# Patient Record
Sex: MALE | Race: WHITE | NOT HISPANIC OR LATINO | Employment: FULL TIME | URBAN - NONMETROPOLITAN AREA
[De-identification: names, ages, dates, MRNs, and addresses within clinical notes are randomized per-mention and may not be internally consistent; named-entity substitution may affect disease eponyms.]

---

## 2023-10-17 ENCOUNTER — OFFICE VISIT (OUTPATIENT)
Dept: URGENT CARE | Facility: CLINIC | Age: 43
End: 2023-10-17
Payer: COMMERCIAL

## 2023-10-17 VITALS
HEART RATE: 82 BPM | RESPIRATION RATE: 16 BRPM | TEMPERATURE: 97 F | WEIGHT: 250 LBS | OXYGEN SATURATION: 98 % | BODY MASS INDEX: 41.65 KG/M2 | SYSTOLIC BLOOD PRESSURE: 132 MMHG | HEIGHT: 65 IN | DIASTOLIC BLOOD PRESSURE: 84 MMHG

## 2023-10-17 DIAGNOSIS — L03.032 PARONYCHIA OF GREAT TOE, LEFT: Primary | ICD-10-CM

## 2023-10-17 PROCEDURE — 99213 OFFICE O/P EST LOW 20 MIN: CPT

## 2023-10-17 RX ORDER — SUMATRIPTAN 50 MG/1
TABLET, FILM COATED ORAL
COMMUNITY
Start: 2023-07-21

## 2023-10-17 RX ORDER — TIZANIDINE 2 MG/1
TABLET ORAL
COMMUNITY

## 2023-10-17 RX ORDER — BUSPIRONE HYDROCHLORIDE 7.5 MG/1
TABLET ORAL
COMMUNITY
Start: 2023-09-17

## 2023-10-17 RX ORDER — PREGABALIN 150 MG/1
CAPSULE ORAL
COMMUNITY

## 2023-10-17 RX ORDER — DOXYCYCLINE 100 MG/1
100 TABLET ORAL 2 TIMES DAILY
Qty: 14 TABLET | Refills: 0 | Status: SHIPPED | OUTPATIENT
Start: 2023-10-17 | End: 2023-10-24

## 2023-10-17 RX ORDER — PROPRANOLOL HYDROCHLORIDE 40 MG/1
40 TABLET ORAL 2 TIMES DAILY
COMMUNITY

## 2023-10-17 RX ORDER — LOSARTAN POTASSIUM 50 MG/1
1 TABLET ORAL DAILY
COMMUNITY
Start: 2023-08-07

## 2023-10-17 NOTE — PROGRESS NOTES
[facility]        NAME: Ashley Vasques is a 41 y.o. male  : 1980    MRN: [id number]  DATE: 2023  TIME: 6:13 PM    Assessment and Plan   Paronychia of great toe, left [L03.032]  1. Paronychia of great toe, left  Ambulatory Referral to Podiatry    doxycycline (ADOXA) 100 MG tablet        Clinical findings correlate with paronychia and cellulitis however abscess formation not I&D able. Will treat with doxycycline given documented allergies. Encouraged continued supportive measures. Monitor for signs of worsening infection. Referral placed to Podiatry. Follow up with PCP in 3-5 days or proceed to emergency department for worsening symptoms. Patient verbalized understanding of instructions given. Patient Instructions     Patient Instructions   Take antibiotic as prescribed  Warm soaks  Can apply topical antibiotic ointment  Monitor for signs of worsening infection  Referral placed to Podiatry   Follow up with PCP in 3-5 days. Proceed to  ER if symptoms worsen. Paronychia   WHAT YOU NEED TO KNOW:   What is paronychia? Paronychia is an infection of your nail fold caused by bacteria or a fungus. The nail fold is the skin around your nail. Paronychia may happen suddenly and last for 6 weeks or longer. You may have paronychia on more than 1 finger or toe. What increases my risk for paronychia? Trauma:  Any injury that causes your skin to tear can lead to infection. Your risk is increased if you have ingrown nails, bite your nails, or wear acrylic nails. Frequent contact with water:  Jobs that require you to soak your hands in water often may increase your risk for paronychia. Common examples are nurses, cooks, and . Swimmers also have increased risk. Medical conditions:  Diabetes and other conditions that cause a weak immune system can increase your risk. Some examples are skin cancer, psoriasis, HIV, and lupus.     Chemicals:  Contact with soaps, detergents, and other chemicals can cause inflammation and lead to paronychia. Allergies: Allergies to certain foods, nail polish, or latex can cause inflammation and increase your risk. What are the signs and symptoms of paronychia? Red, hot, swollen, painful nail fold    Pus coming out of your nail fold when you press on it    Nail that pulls away from your nail fold and may fall off    Changes in nail color, such as green nails    Fever    Thick, rough nail, or ridges in the nail    How is paronychia diagnosed? Your healthcare provider will examine your nails and ask about your symptoms. Your provider may press on your infected nail to see if pus drains from it. Your provider will send any pus to a lab for tests to learn what germ is causing your infection. This is called a fluid culture. How is paronychia treated? Medicine:     Td vaccine  is a booster shot used to help prevent tetanus and diphtheria. The Td booster may be given to adolescents and adults every 10 years or for certain wounds and injuries. Antibiotics: This medicine will help fight or prevent an infection caused by bacteria. It may be given as a pill, cream, or ointment. Steroids: This medicine will help decrease inflammation. It may be given as a pill, cream, or ointment. Antifungal medicine: This medicine helps kill fungus that may be causing your infection. It may be given as a cream or ointment. NSAIDs:  These medicines decrease pain and swelling. NSAIDs are available without a doctor's order. Ask your healthcare provider which medicine is right for you. Ask how much to take and when to take it. Take as directed. NSAIDs can cause stomach bleeding and kidney problems if not taken correctly. Procedures: You may need surgery to drain an abscess (pus pocket) in your finger or toe. Your nail may need to be removed. Infected tissue around your nail may also need to be removed. What are the risks of paronychia? Your nail may become loose, deformed, or fall off. The infection may form a large abscess on your nail. The infection may spread to nearby tissue and bone. How can paronychia be prevented? Avoid chemicals and allergens that may harm your skin and nails. This includes soaps, laundry detergents, and nail products. Keep your nails clean and dry. Do not soak your nails in water. Use cotton-lined rubber gloves or wear 2 rubber gloves if you work with food or water. The gloves will help protect your nail folds. Keep your nails short. Do not bite your nails, pick at your hangnails, suck your fingers, or wear fake nails. Bring your own nail tools when you go to the nail salon. How can I manage my symptoms? Soak your nail:  Soak your nail in a mixture of equal parts vinegar and water 3 or 4 times each day. This will help decrease inflammation. Apply a warm compress:  Soak a washcloth in warm water and place it on your nail. This will help decrease inflammation. Elevate:  Raise your nail above the level of your heart as often as you can. This will help decrease swelling and pain. Prop your nail on pillows or blankets to keep it elevated comfortably. Use lotion:  Apply lotion after you wash your hands. This will prevent the skin from becoming too dry. When should I seek immediate care? You have severe nail pain. The inflammation spreads to your hand or arm. When should I call my doctor? Your nail becomes loose, deformed, or falls off. You have a large abscess on your nail. You have questions or concerns about your condition or care. CARE AGREEMENT:   You have the right to help plan your care. Learn about your health condition and how it may be treated. Discuss treatment options with your healthcare providers to decide what care you want to receive. You always have the right to refuse treatment. The above information is an  only.  It is not intended as medical advice for individual conditions or treatments. Talk to your doctor, nurse or pharmacist before following any medical regimen to see if it is safe and effective for you. © Copyright [employer] 2023 Information is for End User's use only and may not be sold, redistributed or otherwise used for commercial purposes. Chief Complaint     Chief Complaint   Patient presents with    Ingrown Toenail     Ingrown toenail on left great toe         History of Present Illness       46-year-old male with a past medical history significant for hypertension presents with complaints of ingrown toenail. Patient reports removing corner of toenail yesterday and since has had increased redness, swelling, drainage, and pain. He has not been performing warm soaks or applying topical antibiotic ointment. Denies any specific known injury or trauma. Denies fever, chills, or flu-like symptoms. No history of diabetes. Review of Systems   Review of Systems   Constitutional:  Negative for chills and fever. Respiratory:  Negative for cough and shortness of breath. Cardiovascular:  Negative for chest pain. Gastrointestinal:  Negative for abdominal pain, diarrhea, nausea and vomiting. Musculoskeletal:  Negative for arthralgias and myalgias. Skin:  Positive for color change. Neurological:  Negative for weakness and numbness.          Current Medications       Current Outpatient Medications:     busPIRone (BUSPAR) 7.5 mg tablet, take 1 tablet by mouth every morning and 1 BEFORE BEDTIME, Disp: , Rfl:     doxycycline (ADOXA) 100 MG tablet, Take 1 tablet (100 mg total) by mouth 2 (two) times a day for 7 days, Disp: 14 tablet, Rfl: 0    losartan (COZAAR) 50 mg tablet, Take 1 tablet by mouth daily, Disp: , Rfl:     pregabalin (LYRICA) 150 mg capsule, take 1 capsule by mouth every morning and 1 capsule BEFORE BEDTIME, Disp: , Rfl:     propranolol (INDERAL) 40 mg tablet, Take 40 mg by mouth 2 (two) times a day, Disp: , Rfl: SUMAtriptan (IMITREX) 50 mg tablet, take 1 tablet by mouth if needed AT ONSET OF HEADACHE may repeat in 2 hours IF headache PERSISTS maximum daily dose of 2 tablets ( 100 milligrams ) every 24 hours, Disp: , Rfl:     tiZANidine (ZANAFLEX) 2 mg tablet, take 1/4 to 1/2 tablet by mouth twice a day and 1 tablet by mouth at bedtime as needed, Disp: , Rfl:     Current Allergies     Allergies as of 10/17/2023 - Reviewed 10/17/2023   Allergen Reaction Noted    Penicillins Other (See Comments) 06/27/2018    Sulfa antibiotics Rash 06/27/2018            The following portions of the patient's history were reviewed and updated as appropriate: allergies, current medications, past family history, past medical history, past social history, past surgical history and problem list.     Past Medical History:   Diagnosis Date    Anxiety     Hypertension     Migraine        Past Surgical History:   Procedure Laterality Date    NO PAST SURGERIES         Family History   Problem Relation Age of Onset    No Known Problems Mother     Diabetes Father     Hyperlipidemia Father     Hypertension Father          Medications have been verified. Objective   /84   Pulse 82   Temp (!) 97 °F (36.1 °C)   Resp 16   Ht 5' 5" (1.651 m)   Wt 113 kg (250 lb)   SpO2 98%   BMI 41.60 kg/m²   No LMP for male patient. Physical Exam     Physical Exam  Vitals and nursing note reviewed. Constitutional:       General: He is not in acute distress. Appearance: He is not toxic-appearing. HENT:      Head: Normocephalic. Nose: Nose normal.   Eyes:      Conjunctiva/sclera: Conjunctivae normal.   Pulmonary:      Effort: Pulmonary effort is normal.   Skin:     General: Skin is warm and dry. Findings: Erythema present. Neurological:      Mental Status: He is alert and oriented to person, place, and time. Sensory: Sensation is intact. Motor: Motor function is intact. Gait: Gait is intact.    Psychiatric: Mood and Affect: Mood normal.         Behavior: Behavior normal.

## 2023-10-17 NOTE — PATIENT INSTRUCTIONS
Take antibiotic as prescribed  Warm soaks  Can apply topical antibiotic ointment  Monitor for signs of worsening infection  Referral placed to Podiatry   Follow up with PCP in 3-5 days. Proceed to  ER if symptoms worsen. Paronychia   WHAT YOU NEED TO KNOW:   What is paronychia? Paronychia is an infection of your nail fold caused by bacteria or a fungus. The nail fold is the skin around your nail. Paronychia may happen suddenly and last for 6 weeks or longer. You may have paronychia on more than 1 finger or toe. What increases my risk for paronychia? Trauma:  Any injury that causes your skin to tear can lead to infection. Your risk is increased if you have ingrown nails, bite your nails, or wear acrylic nails. Frequent contact with water:  Jobs that require you to soak your hands in water often may increase your risk for paronychia. Common examples are nurses, cooks, and . Swimmers also have increased risk. Medical conditions:  Diabetes and other conditions that cause a weak immune system can increase your risk. Some examples are skin cancer, psoriasis, HIV, and lupus. Chemicals:  Contact with soaps, detergents, and other chemicals can cause inflammation and lead to paronychia. Allergies: Allergies to certain foods, nail polish, or latex can cause inflammation and increase your risk. What are the signs and symptoms of paronychia? Red, hot, swollen, painful nail fold    Pus coming out of your nail fold when you press on it    Nail that pulls away from your nail fold and may fall off    Changes in nail color, such as green nails    Fever    Thick, rough nail, or ridges in the nail    How is paronychia diagnosed? Your healthcare provider will examine your nails and ask about your symptoms. Your provider may press on your infected nail to see if pus drains from it. Your provider will send any pus to a lab for tests to learn what germ is causing your infection.  This is called a fluid culture. How is paronychia treated? Medicine:     Td vaccine  is a booster shot used to help prevent tetanus and diphtheria. The Td booster may be given to adolescents and adults every 10 years or for certain wounds and injuries. Antibiotics: This medicine will help fight or prevent an infection caused by bacteria. It may be given as a pill, cream, or ointment. Steroids: This medicine will help decrease inflammation. It may be given as a pill, cream, or ointment. Antifungal medicine: This medicine helps kill fungus that may be causing your infection. It may be given as a cream or ointment. NSAIDs:  These medicines decrease pain and swelling. NSAIDs are available without a doctor's order. Ask your healthcare provider which medicine is right for you. Ask how much to take and when to take it. Take as directed. NSAIDs can cause stomach bleeding and kidney problems if not taken correctly. Procedures: You may need surgery to drain an abscess (pus pocket) in your finger or toe. Your nail may need to be removed. Infected tissue around your nail may also need to be removed. What are the risks of paronychia? Your nail may become loose, deformed, or fall off. The infection may form a large abscess on your nail. The infection may spread to nearby tissue and bone. How can paronychia be prevented? Avoid chemicals and allergens that may harm your skin and nails. This includes soaps, laundry detergents, and nail products. Keep your nails clean and dry. Do not soak your nails in water. Use cotton-lined rubber gloves or wear 2 rubber gloves if you work with food or water. The gloves will help protect your nail folds. Keep your nails short. Do not bite your nails, pick at your hangnails, suck your fingers, or wear fake nails. Bring your own nail tools when you go to the nail salon. How can I manage my symptoms?    Soak your nail:  Soak your nail in a mixture of equal parts vinegar and water 3 or 4 times each day. This will help decrease inflammation. Apply a warm compress:  Soak a washcloth in warm water and place it on your nail. This will help decrease inflammation. Elevate:  Raise your nail above the level of your heart as often as you can. This will help decrease swelling and pain. Prop your nail on pillows or blankets to keep it elevated comfortably. Use lotion:  Apply lotion after you wash your hands. This will prevent the skin from becoming too dry. When should I seek immediate care? You have severe nail pain. The inflammation spreads to your hand or arm. When should I call my doctor? Your nail becomes loose, deformed, or falls off. You have a large abscess on your nail. You have questions or concerns about your condition or care. CARE AGREEMENT:   You have the right to help plan your care. Learn about your health condition and how it may be treated. Discuss treatment options with your healthcare providers to decide what care you want to receive. You always have the right to refuse treatment. The above information is an  only. It is not intended as medical advice for individual conditions or treatments. Talk to your doctor, nurse or pharmacist before following any medical regimen to see if it is safe and effective for you. © Copyright [employer] 2023 Information is for End User's use only and may not be sold, redistributed or otherwise used for commercial purposes.

## 2023-12-24 ENCOUNTER — OFFICE VISIT (OUTPATIENT)
Dept: URGENT CARE | Facility: CLINIC | Age: 43
End: 2023-12-24
Payer: COMMERCIAL

## 2023-12-24 VITALS
HEART RATE: 76 BPM | OXYGEN SATURATION: 94 % | DIASTOLIC BLOOD PRESSURE: 82 MMHG | WEIGHT: 250 LBS | HEIGHT: 66 IN | RESPIRATION RATE: 16 BRPM | SYSTOLIC BLOOD PRESSURE: 120 MMHG | TEMPERATURE: 98.3 F | BODY MASS INDEX: 40.18 KG/M2

## 2023-12-24 DIAGNOSIS — J01.00 ACUTE NON-RECURRENT MAXILLARY SINUSITIS: Primary | ICD-10-CM

## 2023-12-24 DIAGNOSIS — R07.0 THROAT PAIN: ICD-10-CM

## 2023-12-24 LAB — S PYO AG THROAT QL: NEGATIVE

## 2023-12-24 PROCEDURE — 87880 STREP A ASSAY W/OPTIC: CPT

## 2023-12-24 PROCEDURE — 99213 OFFICE O/P EST LOW 20 MIN: CPT

## 2023-12-24 RX ORDER — AZITHROMYCIN 250 MG/1
TABLET, FILM COATED ORAL
Qty: 6 TABLET | Refills: 0 | Status: SHIPPED | OUTPATIENT
Start: 2023-12-24 | End: 2023-12-28

## 2023-12-24 NOTE — PATIENT INSTRUCTIONS
Take antibiotic as prescribed  Plenty of fluids  Can use honey   Cool mist humidifier   Warm gargle with salt water for sore throat   Use Tylenol/ibuprofen as needed for fever or pain    Follow up with PCP in 3-5 days.  Proceed to  ER if symptoms worsen.

## 2023-12-24 NOTE — PROGRESS NOTES
[facility]        NAME: Nomi Hanson is a 43 y.o. male  : 1980    MRN: [id number]  DATE: 2023  TIME: 12:04 PM    Assessment and Plan   Acute non-recurrent maxillary sinusitis [J01.00]  1. Acute non-recurrent maxillary sinusitis  azithromycin (ZITHROMAX) 250 mg tablet      2. Throat pain  POCT rapid strepA        Rapid strep: neg    Patient Instructions     Take antibiotic as prescribed  Plenty of fluids  Can use honey   Cool mist humidifier   Warm gargle with salt water for sore throat   Use Tylenol/ibuprofen as needed for fever or pain    Follow up with PCP in 3-5 days.  Proceed to  ER if symptoms worsen.    Chief Complaint     Chief Complaint   Patient presents with    Cold Like Symptoms     About 3 days ago symptoms of congestion started and two days ago the pain in the throat. No at home testing done.         History of Present Illness       URI   This is a new problem. Episode onset: 3 days. Associated symptoms include congestion and a sore throat. Pertinent negatives include no chest pain, coughing, ear pain, headaches, rhinorrhea, sneezing or wheezing. Treatments tried: sudafed.       Review of Systems   Review of Systems   Constitutional:  Negative for chills, fatigue and fever.   HENT:  Positive for congestion, sinus pressure and sore throat. Negative for ear pain, postnasal drip, rhinorrhea, sneezing and tinnitus.    Eyes:  Negative for photophobia, redness and itching.   Respiratory:  Negative for cough, chest tightness, shortness of breath and wheezing.    Cardiovascular:  Negative for chest pain.   Skin:  Negative for color change and pallor.   Neurological:  Negative for dizziness, light-headedness and headaches.   Psychiatric/Behavioral:  Negative for confusion.          Current Medications       Current Outpatient Medications:     azithromycin (ZITHROMAX) 250 mg tablet, Take 2 tablets today then 1 tablet daily x 4 days, Disp: 6 tablet, Rfl: 0    busPIRone  "(BUSPAR) 7.5 mg tablet, take 1 tablet by mouth every morning and 1 BEFORE BEDTIME, Disp: , Rfl:     losartan (COZAAR) 50 mg tablet, Take 1 tablet by mouth daily, Disp: , Rfl:     pregabalin (LYRICA) 150 mg capsule, take 1 capsule by mouth every morning and 1 capsule BEFORE BEDTIME, Disp: , Rfl:     propranolol (INDERAL) 40 mg tablet, Take 40 mg by mouth 2 (two) times a day, Disp: , Rfl:     SUMAtriptan (IMITREX) 50 mg tablet, take 1 tablet by mouth if needed AT ONSET OF HEADACHE may repeat in 2 hours IF headache PERSISTS maximum daily dose of 2 tablets ( 100 milligrams ) every 24 hours, Disp: , Rfl:     tiZANidine (ZANAFLEX) 2 mg tablet, take 1/4 to 1/2 tablet by mouth twice a day and 1 tablet by mouth at bedtime as needed, Disp: , Rfl:     Current Allergies     Allergies as of 12/24/2023 - Reviewed 12/24/2023   Allergen Reaction Noted    Penicillins Other (See Comments) 06/27/2018    Sulfa antibiotics Rash 06/27/2018            The following portions of the patient's history were reviewed and updated as appropriate: allergies, current medications, past family history, past medical history, past social history, past surgical history and problem list.     Past Medical History:   Diagnosis Date    Anxiety     Back pain     Hypertension     Migraine        Past Surgical History:   Procedure Laterality Date    GROIN MASS OPEN BIOPSY      ROOT CANAL         Family History   Problem Relation Age of Onset    No Known Problems Mother     Diabetes Father     Hyperlipidemia Father     Hypertension Father          Medications have been verified.        Objective   /82   Pulse 76   Temp 98.3 °F (36.8 °C) (Tympanic)   Resp 16   Ht 5' 6\" (1.676 m)   Wt 113 kg (250 lb)   SpO2 94%   BMI 40.35 kg/m²        Physical Exam     Physical Exam  Constitutional:       Appearance: Normal appearance.   HENT:      Head: Normocephalic.      Right Ear: Tympanic membrane and external ear normal.      Left Ear: Tympanic membrane and " external ear normal.      Nose: Congestion present.      Mouth/Throat:      Mouth: Mucous membranes are moist.      Pharynx: Oropharynx is clear. Posterior oropharyngeal erythema present. No oropharyngeal exudate.   Eyes:      Extraocular Movements: Extraocular movements intact.      Conjunctiva/sclera: Conjunctivae normal.      Pupils: Pupils are equal, round, and reactive to light.   Cardiovascular:      Rate and Rhythm: Normal rate and regular rhythm.      Pulses: Normal pulses.      Heart sounds: Normal heart sounds.   Pulmonary:      Effort: Pulmonary effort is normal. No respiratory distress.      Breath sounds: Normal breath sounds. No stridor. No wheezing, rhonchi or rales.   Musculoskeletal:      Cervical back: No tenderness.   Lymphadenopathy:      Cervical: No cervical adenopathy.   Skin:     General: Skin is warm and dry.   Neurological:      General: No focal deficit present.      Mental Status: He is alert and oriented to person, place, and time. Mental status is at baseline.   Psychiatric:         Mood and Affect: Mood normal.         Behavior: Behavior normal.         Thought Content: Thought content normal.         Judgment: Judgment normal.                    5

## 2024-08-03 ENCOUNTER — OFFICE VISIT (OUTPATIENT)
Dept: URGENT CARE | Facility: CLINIC | Age: 44
End: 2024-08-03
Payer: COMMERCIAL

## 2024-08-03 VITALS
RESPIRATION RATE: 18 BRPM | DIASTOLIC BLOOD PRESSURE: 88 MMHG | HEIGHT: 66 IN | TEMPERATURE: 98.3 F | SYSTOLIC BLOOD PRESSURE: 136 MMHG | OXYGEN SATURATION: 97 % | WEIGHT: 265 LBS | BODY MASS INDEX: 42.59 KG/M2 | HEART RATE: 79 BPM

## 2024-08-03 DIAGNOSIS — K64.9 HEMORRHOIDS, UNSPECIFIED HEMORRHOID TYPE: Primary | ICD-10-CM

## 2024-08-03 PROCEDURE — S9088 SERVICES PROVIDED IN URGENT: HCPCS

## 2024-08-03 PROCEDURE — 99213 OFFICE O/P EST LOW 20 MIN: CPT

## 2024-08-03 RX ORDER — HYDROCORTISONE ACETATE 25 MG/1
25 SUPPOSITORY RECTAL 2 TIMES DAILY
Qty: 12 SUPPOSITORY | Refills: 0 | Status: SHIPPED | OUTPATIENT
Start: 2024-08-03

## 2024-08-03 NOTE — PATIENT INSTRUCTIONS
Use suppositories as prescribed  Can continue prepH and lidocaine  Can use Tucks pads as well  Sitz bath  Follow up with colorectal  Follow up with PCP in 3-5 days.  Proceed to  ER if symptoms worsen.    If tests are performed, our office will contact you with results only if changes need to made to the care plan discussed with you at the visit. You can review your full results on [facility].

## 2024-08-03 NOTE — PROGRESS NOTES
[facility]        NAME: Nomi Hanson is a 44 y.o. male  : 1980    MRN: [id number]  DATE: August 3, 2024  TIME: 3:36 PM    Assessment and Plan   Hemorrhoids, unspecified hemorrhoid type [K64.9]  1. Hemorrhoids, unspecified hemorrhoid type  hydrocortisone (ANUSOL-HC) 25 mg suppository    Ambulatory Referral to Colorectal Surgery        Chaperone present when doing rectal examination.  No obvious external hemorrhoids at this time.  Will prescribe suppositories to help with pain.  Gave referral for colorectal to follow-up with for further evaluation.  Advised if pain worsens or dark red blood in stool or anything worrisome appears, to proceed to ER.    Patient Instructions     Use suppositories as prescribed  Can continue prepH and lidocaine  Can use Tucks pads as well  Sitz bath  Follow up with colorectal  Follow up with PCP in 3-5 days.  Proceed to  ER if symptoms worsen.    If tests are performed, our office will contact you with results only if changes need to made to the care plan discussed with you at the visit. You can review your full results on [facility].    Chief Complaint     Chief Complaint   Patient presents with    Rectal Pain     C/o rectal pain with pressure, patient states he thinks he has hemorrhoid. Onset x3 weeks ago. Pt states he has been using the off brand of prep H with no relief. He stated he started using prescription lidocaine that has helped with the pain but not pressure.          History of Present Illness       Patient is presenting with rectal pain and pressure x 3 weeks.  He states he thinks he has hemorrhoid.  He has been using Preparation H and prescription lidocaine with some relief.  He stated he did have bleeding in the beginning but has not since.  He stated the blood was bright red in the stool.  He denies any black stools.  He stated he did have diarrhea yesterday which he thinks is causing the irritation.  He stated he took Imodium which  helped.  He stated he is having a hard time sleeping due to the pain.        Review of Systems   Review of Systems   Constitutional: Negative.    Respiratory: Negative.     Cardiovascular: Negative.    Gastrointestinal:  Positive for rectal pain. Negative for abdominal pain, blood in stool, diarrhea, nausea and vomiting.   Genitourinary: Negative.          Current Medications       Current Outpatient Medications:     busPIRone (BUSPAR) 7.5 mg tablet, take 1 tablet by mouth every morning and 1 BEFORE BEDTIME, Disp: , Rfl:     hydrocortisone (ANUSOL-HC) 25 mg suppository, Insert 1 suppository (25 mg total) into the rectum 2 (two) times a day, Disp: 12 suppository, Rfl: 0    losartan (COZAAR) 50 mg tablet, Take 1 tablet by mouth daily, Disp: , Rfl:     pregabalin (LYRICA) 150 mg capsule, take 1 capsule by mouth every morning and 1 capsule BEFORE BEDTIME, Disp: , Rfl:     propranolol (INDERAL) 40 mg tablet, Take 40 mg by mouth 2 (two) times a day, Disp: , Rfl:     SUMAtriptan (IMITREX) 50 mg tablet, take 1 tablet by mouth if needed AT ONSET OF HEADACHE may repeat in 2 hours IF headache PERSISTS maximum daily dose of 2 tablets ( 100 milligrams ) every 24 hours, Disp: , Rfl:     tiZANidine (ZANAFLEX) 2 mg tablet, take 1/4 to 1/2 tablet by mouth twice a day and 1 tablet by mouth at bedtime as needed, Disp: , Rfl:     Current Allergies     Allergies as of 08/03/2024 - Reviewed 08/03/2024   Allergen Reaction Noted    Penicillins Other (See Comments) 06/27/2018    Sulfa antibiotics Rash 06/27/2018            The following portions of the patient's history were reviewed and updated as appropriate: allergies, current medications, past family history, past medical history, past social history, past surgical history and problem list.     Past Medical History:   Diagnosis Date    Anxiety     Back pain     Hypertension     Migraine        Past Surgical History:   Procedure Laterality Date    GROIN MASS OPEN BIOPSY      ROOT CANAL    "      Family History   Problem Relation Age of Onset    No Known Problems Mother     Diabetes Father     Hyperlipidemia Father     Hypertension Father          Medications have been verified.        Objective   /88   Pulse 79   Temp 98.3 °F (36.8 °C)   Resp 18   Ht 5' 6\" (1.676 m)   Wt 120 kg (265 lb)   SpO2 97%   BMI 42.77 kg/m²        Physical Exam     Physical Exam  Exam conducted with a chaperone present.   Constitutional:       Appearance: Normal appearance.   Cardiovascular:      Rate and Rhythm: Normal rate.      Pulses: Normal pulses.   Pulmonary:      Effort: Pulmonary effort is normal.   Abdominal:      General: Abdomen is flat. Bowel sounds are normal. There is no distension.   Genitourinary:     Rectum: Normal.      Comments: No visible external hemorrhoids  Skin:     General: Skin is warm and dry.   Neurological:      General: No focal deficit present.      Mental Status: He is alert and oriented to person, place, and time. Mental status is at baseline.                   "

## 2024-08-05 DIAGNOSIS — K64.9 HEMORRHOIDS, UNSPECIFIED HEMORRHOID TYPE: ICD-10-CM

## 2024-08-05 RX ORDER — HYDROCORTISONE ACETATE 25 MG/1
SUPPOSITORY RECTAL
Qty: 12 SUPPOSITORY | Refills: 0 | Status: SHIPPED | OUTPATIENT
Start: 2024-08-05

## 2024-08-15 ENCOUNTER — TELEPHONE (OUTPATIENT)
Dept: URGENT CARE | Facility: CLINIC | Age: 44
End: 2024-08-15

## 2024-08-15 NOTE — TELEPHONE ENCOUNTER
Patient called for information about the referral given to him at visit on 08/03/24. Inquired if he can do anywhere for the consult. Reports finding a doctor in [place].   Patient instructed to print consult from his [facility]. Reports that not sure if his printer works. Instructed patient to stop at [facility], and a printed copy will be at the . Verbalized an understanding.

## 2025-02-07 ENCOUNTER — OFFICE VISIT (OUTPATIENT)
Dept: URGENT CARE | Facility: CLINIC | Age: 45
End: 2025-02-07
Payer: COMMERCIAL

## 2025-02-07 VITALS
WEIGHT: 269 LBS | HEIGHT: 66 IN | HEART RATE: 102 BPM | TEMPERATURE: 99.3 F | DIASTOLIC BLOOD PRESSURE: 70 MMHG | OXYGEN SATURATION: 96 % | RESPIRATION RATE: 18 BRPM | BODY MASS INDEX: 43.23 KG/M2 | SYSTOLIC BLOOD PRESSURE: 122 MMHG

## 2025-02-07 DIAGNOSIS — R68.89 FLU-LIKE SYMPTOMS: Primary | ICD-10-CM

## 2025-02-07 PROCEDURE — S9088 SERVICES PROVIDED IN URGENT: HCPCS | Performed by: PHYSICIAN ASSISTANT

## 2025-02-07 PROCEDURE — 99213 OFFICE O/P EST LOW 20 MIN: CPT | Performed by: PHYSICIAN ASSISTANT

## 2025-02-07 NOTE — PROGRESS NOTES
[facility]        NAME: Nomi Hanson is a 44 y.o. male  : 1980    MRN: [id number]  DATE: 2025  TIME: 3:44 PM    Assessment and Plan   Flu-like symptoms [R68.89]  1. Flu-like symptoms            Results        Patient Instructions     Assessment & Plan    Viral symptoms may last for a total of 1-3 weeks. Symptoms typically start with a sore throat and progress to include sinus pain/pressure, runny nose (yellow/green), and congestion/cough. The yellow/green color does not necessarily indicate a bacterial sinus infection. If your sinus symptoms worsen on day 10-14, you may want to consider re-evaluation for a possible secondary bacterial sinus infection. Coughs are typically most bothersome the first 1-2 weeks. Coughs frequently linger for 4-6 weeks. However, have your lungs re-evaluated if you develop sudden worsening cough, shortness or breath or chest discomfort.       Vitamin D3 2000 IU daily  Vitamin C 1000mg twice per day  Some studies suggest that Zinc 12.5-15mg every 2 hours while awake x 5 days may shorten the duration cold symptoms by 1-2 days.   Fluids and rest  Nasal saline spray (Extra Strength) 3 drops in each nostril 4x per day may shorten the duration of illness by 1-2 days and help prevent spread.  Over the counter cold medication as needed  Sinus Rinses (used distilled water only and carefully follow instructions)  Salt water gargles and chloraseptic spray  Throat Coat Tea (herbal licorice root tea for sore throat-add honey unless diabetic)  Warm compresses over sinuses  Steam treatment (utilize proper safety precautions when in contact with hot water/steam)    Follow up with PCP in 3-5 days.  Proceed to  ER if symptoms worsen.    If tests have been performed at [facility], our office will contact you with results if changes need to be made to the care plan discussed with you at the visit.  You can review your full results on [facility].    Chief Complaint      Chief Complaint   Patient presents with    Cold Like Symptoms     Chest congestion , fever off and on , cough x 2 days         History of Present Illness     History of Present Illness      URI   This is a new problem. Episode onset: 2d. Associated symptoms include congestion, coughing, headaches and rhinorrhea. Pertinent negatives include no diarrhea, ear pain, nausea, rash, sinus pain, sneezing, sore throat, vomiting or wheezing. Treatments tried: mucinex; dayquil/nyquil vapocool; sudafed; ibuprofen.       Review of Systems   Review of Systems   Constitutional:  Positive for chills, fatigue and fever (tactile).   HENT:  Positive for congestion, rhinorrhea and sinus pressure. Negative for dental problem, ear discharge, ear pain, facial swelling, postnasal drip, sinus pain, sneezing, sore throat and trouble swallowing.    Eyes:  Negative for itching.   Respiratory:  Positive for cough. Negative for chest tightness, shortness of breath and wheezing.    Gastrointestinal:  Negative for diarrhea, nausea and vomiting.   Musculoskeletal:  Positive for myalgias.   Skin:  Negative for rash.   Neurological:  Positive for light-headedness and headaches. Negative for dizziness and weakness.         Current Medications       Current Outpatient Medications:     busPIRone (BUSPAR) 7.5 mg tablet, take 1 tablet by mouth every morning and 1 BEFORE BEDTIME, Disp: , Rfl:     hydrocortisone (ANUSOL-HC) 25 mg suppository, Insert 1 suppository into the rectum 2 times a day, Disp: 12 suppository, Rfl: 0    losartan (COZAAR) 50 mg tablet, Take 1 tablet by mouth daily, Disp: , Rfl:     pregabalin (LYRICA) 150 mg capsule, take 1 capsule by mouth every morning and 1 capsule BEFORE BEDTIME, Disp: , Rfl:     propranolol (INDERAL) 40 mg tablet, Take 40 mg by mouth 2 (two) times a day, Disp: , Rfl:     SUMAtriptan (IMITREX) 50 mg tablet, take 1 tablet by mouth if needed AT ONSET OF HEADACHE may repeat in 2 hours IF headache PERSISTS maximum  "daily dose of 2 tablets ( 100 milligrams ) every 24 hours, Disp: , Rfl:     tiZANidine (ZANAFLEX) 2 mg tablet, take 1/4 to 1/2 tablet by mouth twice a day and 1 tablet by mouth at bedtime as needed, Disp: , Rfl:     Current Allergies     Allergies as of 02/07/2025 - Reviewed 02/07/2025   Allergen Reaction Noted    Penicillins Other (See Comments) 06/27/2018    Sulfa antibiotics Rash 06/27/2018            The following portions of the patient's history were reviewed and updated as appropriate: allergies, current medications, past family history, past medical history, past social history, past surgical history and problem list.     Past Medical History:   Diagnosis Date    Anxiety     Back pain     Hypertension     Migraine        Past Surgical History:   Procedure Laterality Date    GROIN MASS OPEN BIOPSY      ROOT CANAL         Family History   Problem Relation Age of Onset    No Known Problems Mother     Diabetes Father     Hyperlipidemia Father     Hypertension Father          Medications have been verified.        Objective   /70   Pulse 102   Temp 99.3 °F (37.4 °C)   Resp 18   Ht 5' 6\" (1.676 m)   Wt 122 kg (269 lb)   SpO2 96%   BMI 43.42 kg/m²   No LMP for male patient.       Physical Exam     Physical Exam  Vitals reviewed.   Constitutional:       General: He is not in acute distress.     Appearance: He is well-developed. He is not diaphoretic.   HENT:      Head: Normocephalic.      Right Ear: Tympanic membrane, ear canal and external ear normal.      Left Ear: Tympanic membrane, ear canal and external ear normal.      Nose: Nose normal.      Mouth/Throat:      Pharynx: No oropharyngeal exudate or posterior oropharyngeal erythema.   Eyes:      Conjunctiva/sclera: Conjunctivae normal.   Cardiovascular:      Rate and Rhythm: Normal rate and regular rhythm.      Heart sounds: Normal heart sounds. No murmur heard.     No friction rub. No gallop.   Pulmonary:      Effort: Pulmonary effort is normal. No " respiratory distress.      Breath sounds: Normal breath sounds. No wheezing, rhonchi or rales.   Lymphadenopathy:      Cervical: No cervical adenopathy.   Skin:     General: Skin is warm.   Neurological:      Mental Status: He is alert and oriented to person, place, and time.   Psychiatric:         Behavior: Behavior normal.         Thought Content: Thought content normal.         Judgment: Judgment normal.

## 2025-02-07 NOTE — PATIENT INSTRUCTIONS
Viral symptoms may last for a total of 1-3 weeks. Symptoms typically start with a sore throat and progress to include sinus pain/pressure, runny nose (yellow/green), and congestion/cough. The yellow/green color does not necessarily indicate a bacterial sinus infection. If your sinus symptoms worsen on day 10-14, you may want to consider re-evaluation for a possible secondary bacterial sinus infection. Coughs are typically most bothersome the first 1-2 weeks. Coughs frequently linger for 4-6 weeks. However, have your lungs re-evaluated if you develop sudden worsening cough, shortness or breath or chest discomfort.       Vitamin D3 2000 IU daily  Vitamin C 1000mg twice per day  Some studies suggest that Zinc 12.5-15mg every 2 hours while awake x 5 days may shorten the duration cold symptoms by 1-2 days.   Fluids and rest  Nasal saline spray (Extra Strength) 3 drops in each nostril 4x per day may shorten the duration of illness by 1-2 days and help prevent spread.  Over the counter cold medication as needed  Sinus Rinses (used distilled water only and carefully follow instructions)  Salt water gargles and chloraseptic spray  Throat Coat Tea (herbal licorice root tea for sore throat-add honey unless diabetic)  Warm compresses over sinuses  Steam treatment (utilize proper safety precautions when in contact with hot water/steam)    Follow up with PCP in 3-5 days.  Proceed to  ER if symptoms worsen.    If tests have been performed at [facility], our office will contact you with results if changes need to be made to the care plan discussed with you at the visit.  You can review your full results on [facility].

## 2025-02-07 NOTE — LETTER
February 7, 2025     Patient: Nomi Hanson   YOB: 1980   Date of Visit: 2/7/2025       To Whom It May Concern:    It is my medical opinion that Nomi Hanson may return to work on 2/12/2025 or sooner if symptoms improve .    If you have any questions or concerns, please don't hesitate to call.         Sincerely,        Michela Pantoja PA-C    CC: No Recipients